# Patient Record
Sex: FEMALE | Race: BLACK OR AFRICAN AMERICAN | Employment: UNEMPLOYED | URBAN - METROPOLITAN AREA
[De-identification: names, ages, dates, MRNs, and addresses within clinical notes are randomized per-mention and may not be internally consistent; named-entity substitution may affect disease eponyms.]

---
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
## 2025-01-09 LAB
T3 SERPL-MCNC: 100 NG/DL (ref 71–180)
T4 FREE SERPL-MCNC: 1.2 NG/DL (ref 0.93–1.6)
TSH SERPL DL<=0.005 MIU/L-ACNC: 0.6 UIU/ML (ref 0.45–4.5)

## 2025-01-10 ENCOUNTER — TELEMEDICINE (OUTPATIENT)
Age: 20
End: 2025-01-10
Payer: COMMERCIAL

## 2025-01-10 ENCOUNTER — TELEPHONE (OUTPATIENT)
Age: 20
End: 2025-01-10

## 2025-01-10 DIAGNOSIS — E05.00 GRAVES DISEASE: ICD-10-CM

## 2025-01-10 DIAGNOSIS — E55.9 VITAMIN D DEFICIENCY: ICD-10-CM

## 2025-01-10 DIAGNOSIS — E05.00 GRAVES DISEASE: Primary | ICD-10-CM

## 2025-01-10 PROCEDURE — 99214 OFFICE O/P EST MOD 30 MIN: CPT | Performed by: PEDIATRICS

## 2025-01-10 NOTE — PATIENT INSTRUCTIONS
- Decrease methimazole 10 mg (AM ) and 5 mg (PM)   - Repeat thyroid function in 2 months   - FU in 5 months  - Keep FU apt with Opthalmlogy

## 2025-01-10 NOTE — PROGRESS NOTES
Sara Doran, was evaluated through a synchronous (real-time) audio-video encounter. The patient (or guardian if applicable) is aware that this is a billable service, which includes applicable co-pays. This Virtual Visit was conducted with patient's (and/or legal guardian's) consent. Patient identification was verified, and a caregiver was present when appropriate.   The patient was located at Home: [address] [place] [address] [address] [place] [address]  Provider was located at Home (Appt Dept State): [place]  Confirm you are appropriately licensed, registered, or certified to deliver care in the state where the patient is located as indicated above. If you are not or unsure, please re-schedule the visit: Yes, I confirm.       --Matilde Foster MD on 1/10/2025 at 11:02 AM    An electronic signature was used to authenticate this note.      Subjective:   Reason for visit:   FU   - Graves disease - on methimazole  - Graves orbitopathy  - Vitamin D deficiency    Present today with father  Last seen 5 months ago     History of present illness:  19 y.o. female with Graves disease diagnosed 8/2019 (age 13 years 10 months)   work up for enlarged thyroid-   - NON suppressed TSH of 3.962 uIU/ml(0.45-4.5) with   - high freeT4 of >7.77 ng/dl(0.93-1.6). - high TT3 - >651, - TPO Ab - Negative    Initially had mood changes (7th grade did not go well - mother reports she got into trouble in school), + tiredness    Office Visit on 08/15/2019    Thyroglobulin Ab <1.0  0.0 - 0.9 IU/mL    Thyroid peroxidase Ab 7  0 - 26 IU/mL    Thyroid Stim Immunoglobulin 158.00* 0.00 - 0.55 IU/L    Thyrotropin Receptor Ab, serum 174.00* 0.00 - 1.75 IU/L    T4, Free >7.77* 0.93 - 1.60 ng/dL    TSH <0.006* 0.450 - 4.500 uIU/mL    T3, total >651* 71 - 180 ng/dL     Started on Methimazole 10 mg Bid    Pt was seen in [facility] ED 8/17/2019 -suicidal thoughts and auditory hallucinations (possible thyrotoxicosis with encephalopathy). Found to have hypertension [HR

## 2025-01-10 NOTE — TELEPHONE ENCOUNTER
As of last visit, pt is on Methimazole 10 mg Bid.   Left VM to call me back   Will consider decreasing dose       Resulted Orders   T4, Free   Result Value Ref Range    T4 Free 1.20 0.93 - 1.60 ng/dL    Narrative    Performed at:  [address] [place] [address]  [id number]  : Hardik Ojeda MD, Phone:  [phone]   TSH   Result Value Ref Range    TSH 0.602 0.450 - 4.500 uIU/mL    Narrative    Performed at:  [address] [place] [address]  [id number]  : Hardik Ojeda MD, Phone:  [phone]   T3   Result Value Ref Range    T3, Total 100 71 - 180 ng/dL    Narrative    Performed at:  [address] [place] [address]  [id number]  : Hardik Ojeda MD, Phone:  [phone]